# Patient Record
Sex: MALE | Race: WHITE | Employment: FULL TIME | URBAN - METROPOLITAN AREA
[De-identification: names, ages, dates, MRNs, and addresses within clinical notes are randomized per-mention and may not be internally consistent; named-entity substitution may affect disease eponyms.]

---

## 2021-06-09 ENCOUNTER — APPOINTMENT (OUTPATIENT)
Dept: ULTRASOUND IMAGING | Facility: HOSPITAL | Age: 51
DRG: 853 | End: 2021-06-09
Attending: NURSE PRACTITIONER
Payer: MEDICAID

## 2021-06-09 ENCOUNTER — APPOINTMENT (OUTPATIENT)
Dept: CT IMAGING | Facility: HOSPITAL | Age: 51
DRG: 853 | End: 2021-06-09
Attending: EMERGENCY MEDICINE
Payer: MEDICAID

## 2021-06-09 ENCOUNTER — HOSPITAL ENCOUNTER (INPATIENT)
Facility: HOSPITAL | Age: 51
LOS: 1 days | Discharge: ANOTHER HOSPITAL | DRG: 853 | End: 2021-06-10
Attending: EMERGENCY MEDICINE | Admitting: HOSPITALIST
Payer: MEDICAID

## 2021-06-09 DIAGNOSIS — A41.9 SEPSIS WITH ACUTE RENAL FAILURE, DUE TO UNSPECIFIED ORGANISM, UNSPECIFIED ACUTE RENAL FAILURE TYPE, UNSPECIFIED WHETHER SEPTIC SHOCK PRESENT (HCC): ICD-10-CM

## 2021-06-09 DIAGNOSIS — N17.9 SEPSIS WITH ACUTE RENAL FAILURE, DUE TO UNSPECIFIED ORGANISM, UNSPECIFIED ACUTE RENAL FAILURE TYPE, UNSPECIFIED WHETHER SEPTIC SHOCK PRESENT (HCC): ICD-10-CM

## 2021-06-09 DIAGNOSIS — R65.20 SEPSIS WITH ACUTE RENAL FAILURE, DUE TO UNSPECIFIED ORGANISM, UNSPECIFIED ACUTE RENAL FAILURE TYPE, UNSPECIFIED WHETHER SEPTIC SHOCK PRESENT (HCC): ICD-10-CM

## 2021-06-09 DIAGNOSIS — N49.3 FOURNIER'S GANGRENE IN MALE: Primary | ICD-10-CM

## 2021-06-09 DIAGNOSIS — N49.3 FOURNIER'S GANGRENE: ICD-10-CM

## 2021-06-09 DIAGNOSIS — R73.9 HYPERGLYCEMIA: ICD-10-CM

## 2021-06-09 DIAGNOSIS — N17.9 AKI (ACUTE KIDNEY INJURY) (HCC): ICD-10-CM

## 2021-06-09 PROCEDURE — 80048 BASIC METABOLIC PNL TOTAL CA: CPT | Performed by: EMERGENCY MEDICINE

## 2021-06-09 PROCEDURE — 85027 COMPLETE CBC AUTOMATED: CPT | Performed by: EMERGENCY MEDICINE

## 2021-06-09 PROCEDURE — 83605 ASSAY OF LACTIC ACID: CPT | Performed by: EMERGENCY MEDICINE

## 2021-06-09 PROCEDURE — 99285 EMERGENCY DEPT VISIT HI MDM: CPT

## 2021-06-09 PROCEDURE — 85007 BL SMEAR W/DIFF WBC COUNT: CPT | Performed by: EMERGENCY MEDICINE

## 2021-06-09 PROCEDURE — 85060 BLOOD SMEAR INTERPRETATION: CPT | Performed by: EMERGENCY MEDICINE

## 2021-06-09 PROCEDURE — 85730 THROMBOPLASTIN TIME PARTIAL: CPT | Performed by: EMERGENCY MEDICINE

## 2021-06-09 PROCEDURE — 76870 US EXAM SCROTUM: CPT | Performed by: NURSE PRACTITIONER

## 2021-06-09 PROCEDURE — 74177 CT ABD & PELVIS W/CONTRAST: CPT | Performed by: EMERGENCY MEDICINE

## 2021-06-09 PROCEDURE — 96365 THER/PROPH/DIAG IV INF INIT: CPT

## 2021-06-09 PROCEDURE — 36415 COLL VENOUS BLD VENIPUNCTURE: CPT

## 2021-06-09 PROCEDURE — 87040 BLOOD CULTURE FOR BACTERIA: CPT | Performed by: EMERGENCY MEDICINE

## 2021-06-09 PROCEDURE — 96375 TX/PRO/DX INJ NEW DRUG ADDON: CPT

## 2021-06-09 PROCEDURE — 96361 HYDRATE IV INFUSION ADD-ON: CPT

## 2021-06-09 PROCEDURE — 93975 VASCULAR STUDY: CPT | Performed by: NURSE PRACTITIONER

## 2021-06-09 PROCEDURE — 85025 COMPLETE CBC W/AUTO DIFF WBC: CPT | Performed by: EMERGENCY MEDICINE

## 2021-06-09 PROCEDURE — 85610 PROTHROMBIN TIME: CPT | Performed by: EMERGENCY MEDICINE

## 2021-06-09 RX ORDER — VANCOMYCIN 2 GRAM/500 ML IN 0.9 % SODIUM CHLORIDE INTRAVENOUS
25 ONCE
Status: COMPLETED | OUTPATIENT
Start: 2021-06-09 | End: 2021-06-10

## 2021-06-09 RX ORDER — MORPHINE SULFATE 4 MG/ML
6 INJECTION, SOLUTION INTRAMUSCULAR; INTRAVENOUS ONCE
Status: COMPLETED | OUTPATIENT
Start: 2021-06-09 | End: 2021-06-09

## 2021-06-09 RX ORDER — CLINDAMYCIN PHOSPHATE 600 MG/50ML
600 INJECTION INTRAVENOUS ONCE
Status: COMPLETED | OUTPATIENT
Start: 2021-06-09 | End: 2021-06-10

## 2021-06-10 ENCOUNTER — ANESTHESIA (OUTPATIENT)
Dept: SURGERY | Facility: HOSPITAL | Age: 51
DRG: 853 | End: 2021-06-10
Payer: MEDICAID

## 2021-06-10 ENCOUNTER — APPOINTMENT (OUTPATIENT)
Dept: GENERAL RADIOLOGY | Facility: HOSPITAL | Age: 51
DRG: 853 | End: 2021-06-10
Attending: EMERGENCY MEDICINE
Payer: MEDICAID

## 2021-06-10 ENCOUNTER — ANESTHESIA EVENT (OUTPATIENT)
Dept: SURGERY | Facility: HOSPITAL | Age: 51
DRG: 853 | End: 2021-06-10
Payer: MEDICAID

## 2021-06-10 VITALS
DIASTOLIC BLOOD PRESSURE: 42 MMHG | HEIGHT: 66 IN | OXYGEN SATURATION: 98 % | SYSTOLIC BLOOD PRESSURE: 107 MMHG | WEIGHT: 220 LBS | TEMPERATURE: 100 F | RESPIRATION RATE: 33 BRPM | HEART RATE: 53 BPM | BODY MASS INDEX: 35.36 KG/M2

## 2021-06-10 PROBLEM — N17.9 AKI (ACUTE KIDNEY INJURY) (HCC): Status: ACTIVE | Noted: 2021-06-10

## 2021-06-10 PROBLEM — R65.20 SEPSIS WITH ACUTE RENAL FAILURE, DUE TO UNSPECIFIED ORGANISM, UNSPECIFIED ACUTE RENAL FAILURE TYPE, UNSPECIFIED WHETHER SEPTIC SHOCK PRESENT (HCC): Status: ACTIVE | Noted: 2021-06-10

## 2021-06-10 PROBLEM — N17.9 SEPSIS WITH ACUTE RENAL FAILURE, DUE TO UNSPECIFIED ORGANISM, UNSPECIFIED ACUTE RENAL FAILURE TYPE, UNSPECIFIED WHETHER SEPTIC SHOCK PRESENT (HCC): Status: ACTIVE | Noted: 2021-06-10

## 2021-06-10 PROBLEM — T88.4XXA DIFFICULT INTUBATION: Status: ACTIVE | Noted: 2021-06-10

## 2021-06-10 PROBLEM — A41.9 SEPSIS WITH ACUTE RENAL FAILURE, DUE TO UNSPECIFIED ORGANISM, UNSPECIFIED ACUTE RENAL FAILURE TYPE, UNSPECIFIED WHETHER SEPTIC SHOCK PRESENT (HCC): Status: ACTIVE | Noted: 2021-06-10

## 2021-06-10 PROBLEM — R73.9 HYPERGLYCEMIA: Status: ACTIVE | Noted: 2021-06-10

## 2021-06-10 PROCEDURE — 83605 ASSAY OF LACTIC ACID: CPT | Performed by: EMERGENCY MEDICINE

## 2021-06-10 PROCEDURE — 87070 CULTURE OTHR SPECIMN AEROBIC: CPT | Performed by: UROLOGY

## 2021-06-10 PROCEDURE — 36600 WITHDRAWAL OF ARTERIAL BLOOD: CPT | Performed by: EMERGENCY MEDICINE

## 2021-06-10 PROCEDURE — 0WBM0ZZ EXCISION OF MALE PERINEUM, OPEN APPROACH: ICD-10-PCS | Performed by: UROLOGY

## 2021-06-10 PROCEDURE — 87206 SMEAR FLUORESCENT/ACID STAI: CPT | Performed by: UROLOGY

## 2021-06-10 PROCEDURE — 82962 GLUCOSE BLOOD TEST: CPT

## 2021-06-10 PROCEDURE — 87076 CULTURE ANAEROBE IDENT EACH: CPT | Performed by: UROLOGY

## 2021-06-10 PROCEDURE — S0077 INJECTION, CLINDAMYCIN PHOSP: HCPCS | Performed by: EMERGENCY MEDICINE

## 2021-06-10 PROCEDURE — 83605 ASSAY OF LACTIC ACID: CPT | Performed by: UROLOGY

## 2021-06-10 PROCEDURE — P9047 ALBUMIN (HUMAN), 25%, 50ML: HCPCS | Performed by: HOSPITALIST

## 2021-06-10 PROCEDURE — 87186 SC STD MICRODIL/AGAR DIL: CPT | Performed by: UROLOGY

## 2021-06-10 PROCEDURE — 87102 FUNGUS ISOLATION CULTURE: CPT | Performed by: UROLOGY

## 2021-06-10 PROCEDURE — 87205 SMEAR GRAM STAIN: CPT | Performed by: UROLOGY

## 2021-06-10 PROCEDURE — 87075 CULTR BACTERIA EXCEPT BLOOD: CPT | Performed by: UROLOGY

## 2021-06-10 PROCEDURE — 87176 TISSUE HOMOGENIZATION CULTR: CPT | Performed by: UROLOGY

## 2021-06-10 PROCEDURE — 80048 BASIC METABOLIC PNL TOTAL CA: CPT | Performed by: EMERGENCY MEDICINE

## 2021-06-10 PROCEDURE — 84478 ASSAY OF TRIGLYCERIDES: CPT | Performed by: UROLOGY

## 2021-06-10 PROCEDURE — 0VB50ZZ EXCISION OF SCROTUM, OPEN APPROACH: ICD-10-PCS | Performed by: UROLOGY

## 2021-06-10 PROCEDURE — 93010 ELECTROCARDIOGRAM REPORT: CPT | Performed by: EMERGENCY MEDICINE

## 2021-06-10 PROCEDURE — 87077 CULTURE AEROBIC IDENTIFY: CPT | Performed by: UROLOGY

## 2021-06-10 PROCEDURE — 94002 VENT MGMT INPAT INIT DAY: CPT

## 2021-06-10 PROCEDURE — 87116 MYCOBACTERIA CULTURE: CPT | Performed by: UROLOGY

## 2021-06-10 PROCEDURE — 93005 ELECTROCARDIOGRAM TRACING: CPT

## 2021-06-10 PROCEDURE — 82805 BLOOD GASES W/O2 SATURATION: CPT | Performed by: EMERGENCY MEDICINE

## 2021-06-10 PROCEDURE — 71045 X-RAY EXAM CHEST 1 VIEW: CPT | Performed by: EMERGENCY MEDICINE

## 2021-06-10 RX ORDER — HYDROCODONE BITARTRATE AND ACETAMINOPHEN 5; 325 MG/1; MG/1
2 TABLET ORAL AS NEEDED
Status: DISCONTINUED | OUTPATIENT
Start: 2021-06-10 | End: 2021-06-10

## 2021-06-10 RX ORDER — ONDANSETRON 2 MG/ML
INJECTION INTRAMUSCULAR; INTRAVENOUS AS NEEDED
Status: DISCONTINUED | OUTPATIENT
Start: 2021-06-10 | End: 2021-06-10 | Stop reason: SURG

## 2021-06-10 RX ORDER — LIDOCAINE HYDROCHLORIDE 10 MG/ML
INJECTION, SOLUTION EPIDURAL; INFILTRATION; INTRACAUDAL; PERINEURAL AS NEEDED
Status: DISCONTINUED | OUTPATIENT
Start: 2021-06-10 | End: 2021-06-10 | Stop reason: SURG

## 2021-06-10 RX ORDER — MORPHINE SULFATE 4 MG/ML
4 INJECTION, SOLUTION INTRAMUSCULAR; INTRAVENOUS EVERY 10 MIN PRN
Status: DISCONTINUED | OUTPATIENT
Start: 2021-06-10 | End: 2021-06-10

## 2021-06-10 RX ORDER — DEXTROSE MONOHYDRATE 25 G/50ML
50 INJECTION, SOLUTION INTRAVENOUS
Status: DISCONTINUED | OUTPATIENT
Start: 2021-06-10 | End: 2021-06-10

## 2021-06-10 RX ORDER — ACETAMINOPHEN 500 MG
1000 TABLET ORAL ONCE
Status: COMPLETED | OUTPATIENT
Start: 2021-06-10 | End: 2021-06-10

## 2021-06-10 RX ORDER — SODIUM CHLORIDE, SODIUM LACTATE, POTASSIUM CHLORIDE, CALCIUM CHLORIDE 600; 310; 30; 20 MG/100ML; MG/100ML; MG/100ML; MG/100ML
INJECTION, SOLUTION INTRAVENOUS CONTINUOUS
Status: DISCONTINUED | OUTPATIENT
Start: 2021-06-10 | End: 2021-06-10

## 2021-06-10 RX ORDER — MIDAZOLAM HYDROCHLORIDE 1 MG/ML
2 INJECTION INTRAMUSCULAR; INTRAVENOUS EVERY 5 MIN PRN
Status: DISCONTINUED | OUTPATIENT
Start: 2021-06-10 | End: 2021-06-10

## 2021-06-10 RX ORDER — NEOSTIGMINE METHYLSULFATE 1 MG/ML
INJECTION INTRAVENOUS AS NEEDED
Status: DISCONTINUED | OUTPATIENT
Start: 2021-06-10 | End: 2021-06-10 | Stop reason: SURG

## 2021-06-10 RX ORDER — ADENOSINE 3 MG/ML
INJECTION, SOLUTION INTRAVENOUS
Status: COMPLETED
Start: 2021-06-10 | End: 2021-06-10

## 2021-06-10 RX ORDER — LIDOCAINE HYDROCHLORIDE 40 MG/ML
SOLUTION TOPICAL AS NEEDED
Status: DISCONTINUED | OUTPATIENT
Start: 2021-06-10 | End: 2021-06-10 | Stop reason: SURG

## 2021-06-10 RX ORDER — HYDROCODONE BITARTRATE AND ACETAMINOPHEN 5; 325 MG/1; MG/1
1 TABLET ORAL AS NEEDED
Status: DISCONTINUED | OUTPATIENT
Start: 2021-06-10 | End: 2021-06-10

## 2021-06-10 RX ORDER — PROCHLORPERAZINE EDISYLATE 5 MG/ML
5 INJECTION INTRAMUSCULAR; INTRAVENOUS ONCE AS NEEDED
Status: DISCONTINUED | OUTPATIENT
Start: 2021-06-10 | End: 2021-06-10

## 2021-06-10 RX ORDER — VANCOMYCIN HYDROCHLORIDE
1250 EVERY 12 HOURS
Status: DISCONTINUED | OUTPATIENT
Start: 2021-06-11 | End: 2021-06-10

## 2021-06-10 RX ORDER — CHLORHEXIDINE GLUCONATE 0.12 MG/ML
15 RINSE ORAL
Status: DISCONTINUED | OUTPATIENT
Start: 2021-06-10 | End: 2021-06-10

## 2021-06-10 RX ORDER — HYDROMORPHONE HYDROCHLORIDE 1 MG/ML
0.2 INJECTION, SOLUTION INTRAMUSCULAR; INTRAVENOUS; SUBCUTANEOUS EVERY 5 MIN PRN
Status: DISCONTINUED | OUTPATIENT
Start: 2021-06-10 | End: 2021-06-10

## 2021-06-10 RX ORDER — HYDROMORPHONE HYDROCHLORIDE 1 MG/ML
0.6 INJECTION, SOLUTION INTRAMUSCULAR; INTRAVENOUS; SUBCUTANEOUS EVERY 5 MIN PRN
Status: DISCONTINUED | OUTPATIENT
Start: 2021-06-10 | End: 2021-06-10

## 2021-06-10 RX ORDER — MORPHINE SULFATE 4 MG/ML
2 INJECTION, SOLUTION INTRAMUSCULAR; INTRAVENOUS EVERY 10 MIN PRN
Status: DISCONTINUED | OUTPATIENT
Start: 2021-06-10 | End: 2021-06-10

## 2021-06-10 RX ORDER — ROCURONIUM BROMIDE 10 MG/ML
INJECTION, SOLUTION INTRAVENOUS AS NEEDED
Status: DISCONTINUED | OUTPATIENT
Start: 2021-06-10 | End: 2021-06-10 | Stop reason: SURG

## 2021-06-10 RX ORDER — ADENOSINE 3 MG/ML
6 INJECTION, SOLUTION INTRAVENOUS ONCE
Status: DISCONTINUED | OUTPATIENT
Start: 2021-06-10 | End: 2021-06-10

## 2021-06-10 RX ORDER — ALBUMIN (HUMAN) 12.5 G/50ML
25 SOLUTION INTRAVENOUS ONCE
Status: COMPLETED | OUTPATIENT
Start: 2021-06-10 | End: 2021-06-10

## 2021-06-10 RX ORDER — DILTIAZEM HYDROCHLORIDE 5 MG/ML
INJECTION INTRAVENOUS
Status: COMPLETED
Start: 2021-06-10 | End: 2021-06-10

## 2021-06-10 RX ORDER — HYDROMORPHONE HYDROCHLORIDE 1 MG/ML
0.4 INJECTION, SOLUTION INTRAMUSCULAR; INTRAVENOUS; SUBCUTANEOUS EVERY 5 MIN PRN
Status: DISCONTINUED | OUTPATIENT
Start: 2021-06-10 | End: 2021-06-10

## 2021-06-10 RX ORDER — PHENYLEPHRINE HCL 10 MG/ML
VIAL (ML) INJECTION AS NEEDED
Status: DISCONTINUED | OUTPATIENT
Start: 2021-06-10 | End: 2021-06-10 | Stop reason: SURG

## 2021-06-10 RX ORDER — GLYCOPYRROLATE 0.2 MG/ML
INJECTION, SOLUTION INTRAMUSCULAR; INTRAVENOUS AS NEEDED
Status: DISCONTINUED | OUTPATIENT
Start: 2021-06-10 | End: 2021-06-10 | Stop reason: SURG

## 2021-06-10 RX ORDER — CLINDAMYCIN PHOSPHATE 600 MG/50ML
600 INJECTION INTRAVENOUS EVERY 8 HOURS
Status: DISCONTINUED | OUTPATIENT
Start: 2021-06-10 | End: 2021-06-10

## 2021-06-10 RX ORDER — SODIUM CHLORIDE 9 MG/ML
INJECTION, SOLUTION INTRAVENOUS CONTINUOUS
Status: DISCONTINUED | OUTPATIENT
Start: 2021-06-10 | End: 2021-06-10

## 2021-06-10 RX ORDER — SODIUM CHLORIDE, SODIUM LACTATE, POTASSIUM CHLORIDE, CALCIUM CHLORIDE 600; 310; 30; 20 MG/100ML; MG/100ML; MG/100ML; MG/100ML
INJECTION, SOLUTION INTRAVENOUS CONTINUOUS PRN
Status: DISCONTINUED | OUTPATIENT
Start: 2021-06-10 | End: 2021-06-10 | Stop reason: SURG

## 2021-06-10 RX ORDER — ADENOSINE 3 MG/ML
6 INJECTION, SOLUTION INTRAVENOUS ONCE
Status: COMPLETED | OUTPATIENT
Start: 2021-06-10 | End: 2021-06-10

## 2021-06-10 RX ORDER — NALOXONE HYDROCHLORIDE 0.4 MG/ML
80 INJECTION, SOLUTION INTRAMUSCULAR; INTRAVENOUS; SUBCUTANEOUS AS NEEDED
Status: DISCONTINUED | OUTPATIENT
Start: 2021-06-10 | End: 2021-06-10

## 2021-06-10 RX ORDER — HALOPERIDOL 5 MG/ML
0.25 INJECTION INTRAMUSCULAR ONCE AS NEEDED
Status: DISCONTINUED | OUTPATIENT
Start: 2021-06-10 | End: 2021-06-10

## 2021-06-10 RX ORDER — DILTIAZEM HYDROCHLORIDE 5 MG/ML
20 INJECTION INTRAVENOUS ONCE
Status: COMPLETED | OUTPATIENT
Start: 2021-06-10 | End: 2021-06-10

## 2021-06-10 RX ORDER — DEXAMETHASONE SODIUM PHOSPHATE 4 MG/ML
VIAL (ML) INJECTION AS NEEDED
Status: DISCONTINUED | OUTPATIENT
Start: 2021-06-10 | End: 2021-06-10 | Stop reason: SURG

## 2021-06-10 RX ORDER — MORPHINE SULFATE 10 MG/ML
6 INJECTION, SOLUTION INTRAMUSCULAR; INTRAVENOUS EVERY 10 MIN PRN
Status: DISCONTINUED | OUTPATIENT
Start: 2021-06-10 | End: 2021-06-10

## 2021-06-10 RX ORDER — ONDANSETRON 2 MG/ML
4 INJECTION INTRAMUSCULAR; INTRAVENOUS ONCE AS NEEDED
Status: DISCONTINUED | OUTPATIENT
Start: 2021-06-10 | End: 2021-06-10

## 2021-06-10 RX ADMIN — LIDOCAINE HYDROCHLORIDE 4 ML: 40 SOLUTION TOPICAL at 03:08:00

## 2021-06-10 RX ADMIN — NEOSTIGMINE METHYLSULFATE 5 MG: 1 INJECTION INTRAVENOUS at 04:21:00

## 2021-06-10 RX ADMIN — LIDOCAINE HYDROCHLORIDE 50 MG: 10 INJECTION, SOLUTION EPIDURAL; INFILTRATION; INTRACAUDAL; PERINEURAL at 03:08:00

## 2021-06-10 RX ADMIN — ONDANSETRON 4 MG: 2 INJECTION INTRAMUSCULAR; INTRAVENOUS at 03:08:00

## 2021-06-10 RX ADMIN — SODIUM CHLORIDE, SODIUM LACTATE, POTASSIUM CHLORIDE, CALCIUM CHLORIDE: 600; 310; 30; 20 INJECTION, SOLUTION INTRAVENOUS at 03:08:00

## 2021-06-10 RX ADMIN — GLYCOPYRROLATE 1 MG: 0.2 INJECTION, SOLUTION INTRAMUSCULAR; INTRAVENOUS at 04:21:00

## 2021-06-10 RX ADMIN — MIDAZOLAM HYDROCHLORIDE 2 MG: 1 INJECTION INTRAMUSCULAR; INTRAVENOUS at 04:32:00

## 2021-06-10 RX ADMIN — PHENYLEPHRINE HCL 100 MCG: 10 MG/ML VIAL (ML) INJECTION at 03:56:00

## 2021-06-10 RX ADMIN — SODIUM CHLORIDE, SODIUM LACTATE, POTASSIUM CHLORIDE, CALCIUM CHLORIDE: 600; 310; 30; 20 INJECTION, SOLUTION INTRAVENOUS at 04:54:00

## 2021-06-10 RX ADMIN — ROCURONIUM BROMIDE 50 MG: 10 INJECTION, SOLUTION INTRAVENOUS at 03:26:00

## 2021-06-10 RX ADMIN — DEXAMETHASONE SODIUM PHOSPHATE 4 MG: 4 MG/ML VIAL (ML) INJECTION at 03:08:00

## 2021-06-10 RX ADMIN — SODIUM CHLORIDE, SODIUM LACTATE, POTASSIUM CHLORIDE, CALCIUM CHLORIDE: 600; 310; 30; 20 INJECTION, SOLUTION INTRAVENOUS at 04:04:00

## 2021-06-10 NOTE — PROGRESS NOTES
[facility] Pharmacy Note: Antimicrobial Weight Based Dose Adjustment for: piperacillin/tazobactam (Alexis Guevara)    Reina Will is a 53year old patient who has been prescribed piperacillin/tazobactam (ZOSYN) 3.375 gm x1 in ER.     CrCl cannot be calculated (No success

## 2021-06-10 NOTE — PROGRESS NOTES
[facility] Dosing Service    Initial Pharmacokinetic Consult for Vancomycin AUC Dosing    Rhona Dawn is a 53year old patient who is being treated for sepsis and gangrene . Pharmacy has been asked to dose vancomycin by Lisa Monday.     Weights:  Ideal miller

## 2021-06-10 NOTE — PROGRESS NOTES
[facility] transport is hear to take pt. Patient awake ,explaned the situation and plan of care. Vitals in limit.

## 2021-06-10 NOTE — ANESTHESIA PREPROCEDURE EVALUATION
Anesthesia PreOp Note    HPI:     Rhianna Colon is a 53year old male who presents for preoperative consultation requested by: Bethanie Osman MD    Date of Surgery: 6/9/2021 - 6/10/2021    Procedure(s):  Debridement of scrotum, perineum, and neptali rectal Fo     Lack of Transportation (Non-Medical):   Physical Activity:       Days of Exercise per Week:       Minutes of Exercise per Session:   Stress:       Feeling of Stress :   Social Connections:       Frequency of Communication with Friends and Family:     Cardiovascular - normal exam  (+) dysrhythmias,     ROS comment: PSVT in ER.   Received adnosine    Neuro/Psych      GI/Hepatic/Renal    (+) chronic renal disease ARF,     Comments: Lactic acid 3.3  Cr 1.9  Septic    Endo/Other    (+) diabetes mellitus poor

## 2021-06-10 NOTE — CONSULTS
[facility] AND [facility]  Report of Urology Consultation    [facility] Patient Status:  Emergency    1970 MRN [id number]   Location [address] Attending No att. providers found   Hosp Day # 0 PCP No primary care provider on 06/09/2021    CL 97 06/09/2021    CO2 18.0 06/09/2021     06/09/2021    CA 8.1 06/09/2021    PTT 25.6 06/09/2021    INR 1.39 06/09/2021    PTP 16.8 06/09/2021       Imaging:  CT Scan as noted above. Scrotal US CONCLUSION:   1.  Findings highly jenniffer

## 2021-06-10 NOTE — ANESTHESIA POSTPROCEDURE EVALUATION
Patient: Francis Guillermo    Procedure Summary     Date: 06/10/21 Room / Location: [address] [place] [address] MAIN OR 01 / 300 [facility] MAIN OR    Anesthesia Start: 2920 Anesthesia Stop: 8109    Procedure: Debridement of scrotum, perineum, and neptali rectal Anthony's Gangrene (N/A ) Diagnosi

## 2021-06-10 NOTE — CM/SW NOTE
EDCM talked with Shawn again, they are waiting for attending to call back. [facility] states they are not taking any transfers.  I was able to get a hold of ER charge RN adelso Welsh and she provided me with ER attending number for doctor to doctor discussion [phone]

## 2021-06-10 NOTE — CM/SW NOTE
[facility] called [facility] center [phone] regarding transfer request by [facility] ER physician Cherylene League. Face sheet faxed to [facility] and call back number for ER physician provided. Will continue to follow.

## 2021-06-10 NOTE — RESPIRATORY THERAPY NOTE
Received patient intubated with 8.0 secured 23 cms at the lip. from OR, placed patient on vent with the following settings: AC/12/500/+5/50%/ps 10 maintaining appropriate SpO2. . Auscultated ANGEL BS and suction provided as needed.  Patient tolerating vent wel

## 2021-06-10 NOTE — ED PROVIDER NOTES
Patient Seen in: [facility] AND [facility] Emergency Department      History   Patient presents with:  Testicular Swelling    Stated Complaint: testicular swelling     HPI/Subjective:   HPI    80-year-old male with no significant past medical history presents to Ears: TM's clear b/l  Nose: Nose normal.   Mouth/Throat: MMM, post OP clear with no exudates  Eyes: Conjunctivae and EOM are normal. PERRLA  Neck: Normal range of motion. Neck supple. No tracheal deviation present.    CV: s1s2+, RRR, no m/r/g, normal dist Abnormal; Notable for the following components:    PT 16.8 (*)     INR 1.39 (*)     All other components within normal limits   CBC W/ DIFFERENTIAL - Abnormal; Notable for the following components:    HGB 10.0 (*)     HCT 32.1 (*)     MCV 64.8 (*)     [facility] Dr. Artie Marsh at 12:06 AM LUC Brooks M.D. This report has been electronically signed and verified by the Radiologist whose name is printed above.     DD:  06/09/2021/DT:  06/10/2021    US SCROTUM W/ DOPPLER (VMQ=[id number])    Result Date: 6/9/2021 consultants/admitting physician, and documenting in patient's chart.   Admission disposition: 6/9/2021 10:27 PM            06/09/21 1946 06/09/21  2238 06/10/21  0133   BP: 151/74 136/56 109/78   Pulse: 74 77 (!) 157   Resp: 22 20 18   Temp: 98.2 °F (36.8 surgery to go over case.                    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

## 2021-06-10 NOTE — PROGRESS NOTES
Patient had extensive debridement for Anthony's gangrene. Came from ER tachycardic. Intermittently hypotensive in the OR. Difficult intubation--glidescope needed. Patient left intubated/sedated for sepsis/hemodynamic instability.     Report given

## 2021-06-10 NOTE — PROGRESS NOTES
Pt safety maintained. Pt arrived from OR at 0430. PACU nurses recovered patient in room 236. Pt is intubated. on ventilator. Tolerating suction. Restraints initiated. Matthew intact. Sedated with propofol. Fentanyl drip added for assumed pain.  Wound dressing

## 2021-06-10 NOTE — ED INITIAL ASSESSMENT (HPI)
The patient reports 1 week of hemorrhoid pain with puss and blood with testicular swelling that started yesterday. Malodorous scent detected in triage.

## 2021-06-10 NOTE — ANESTHESIA PROCEDURE NOTES
Airway  Date/Time: 6/10/2021 3:10 AM  Urgency: Elective      General Information and Staff    Patient location during procedure: OR  Anesthesiologist: Aaron Marley MD  Performed: anesthesiologist     Indications and Patient Condition  Indications for air

## 2021-06-10 NOTE — SEPSIS REASSESSMENT
[facility]      Sepsis Reassessment Note    /39 (BP Location: Right arm)   Pulse 55   Temp 99.4 °F (37.4 °C) (Temporal)   Resp (!) 31   Ht 5' 6\" (1.676 m)   Wt 220 lb (99.8 kg)   SpO2 100%   BMI 35.51 kg/m²      I completed the sep

## 2021-06-10 NOTE — OPERATIVE REPORT
[facility]  Urology Procedure Note  Orpah Lofty Patient Status:  Emergency    1970 MRN [id number]   Location [place] [address] Attending Kahlil Suazo MD   Hosp Day # 0 PCP No primary care provider on file.      Procedure: I developed scrotal swelling and pain starting two days ago. He also had drainage from the perineum and perirectal area.   A CT showed extensive Anthony's gangrene involving the scrotum, perineum, and rectal area with gas tracking up the left flank to the le 69

## 2021-06-10 NOTE — CM/SW NOTE
Gabbie Faulkner from [facility] called to request that [facility] surgeon talk to the Nicholas Cox after surgery is completed.

## 2021-06-10 NOTE — SIGNIFICANT EVENT
Brief Nocturnist Admit Note    54 y/o male w/ likely new DM w/o other known med problems admitted from the ED earlier w/ Anthony's gangrene. Pt given 3L of IVF in the ED with some renal insufficiency and presumed sepsis.   He was given IV clinda, vanco an

## 2021-06-11 NOTE — OPERATIVE REPORT
[facility]    PATIENT'S NAME: Lelo Freedman   ATTENDING PHYSICIAN: Mirta Mroeno.  Melissa Diaz MD   OPERATING PHYSICIAN: Krzysztof Miller MD   PATIENT ACCOUNT#:   [id number]    LOCATION:  [address] [place] [address] RECORD #:   [id number]       DATE OF BIRTH:  [birth year] little undermining of the tissue. We took great care avoiding any injury to the rectum itself. When this was said and done, we could see this to be fairly clean.   Using our finger through this opening, we could connect this with the scrotum, and then we 01:30:12  [facility] [id number]  [facility]/

## 2021-06-13 NOTE — H&P
[facility] Status:  [facility]    1970 MRN [id number]   Location [facility] 2W/SW Attending No att. providers found   [facility] Day # 1 PCP No primary care provider on file.      Date: reactive to light, extraocular movements are intact, Normal conjunctiva. HENT:  Normocephalic, oral mucosa is moist.  Head:  Normocephalic, atraumatic.   Neck:  Supple, non-tender, no carotid bruit, no jugular venous distention, no lymphadenopathy, no thyr

## 2021-06-18 NOTE — DISCHARGE SUMMARY
[facility] AND [facility]  Discharge Summary    Mariely Hernandez Patient Status:  Inpatient    1970 MRN [id number]   Location [facility] 2W/SW Attending No att. providers found   [address] Day # 1 PCP No primary care provider on file.      Date of Admission: Howie has Moderate Risk of readmission after discharge from the hospital.      Manuela Ramos  6/18/2021  8:27 AM    Timespent> 30 mins

## 2021-06-30 ENCOUNTER — LAB REQUISITION (OUTPATIENT)
Dept: LAB | Age: 51
End: 2021-06-30

## 2021-06-30 DIAGNOSIS — Z13.9 ENCOUNTER FOR SCREENING, UNSPECIFIED: ICD-10-CM

## 2021-06-30 PROCEDURE — U0003 INFECTIOUS AGENT DETECTION BY NUCLEIC ACID (DNA OR RNA); SEVERE ACUTE RESPIRATORY SYNDROME CORONAVIRUS 2 (SARS-COV-2) (CORONAVIRUS DISEASE [COVID-19]), AMPLIFIED PROBE TECHNIQUE, MAKING USE OF HIGH THROUGHPUT TECHNOLOGIES AS DESCRIBED BY CMS-2020-01-R: HCPCS | Performed by: CLINICAL MEDICAL LABORATORY

## 2021-06-30 PROCEDURE — U0005 INFEC AGEN DETEC AMPLI PROBE: HCPCS | Performed by: CLINICAL MEDICAL LABORATORY

## 2021-06-30 PROCEDURE — PSEU10635 SARS-COV-2 (2019-NCOV) BY PCR PB BKR PSEUDO CODE: Performed by: CLINICAL MEDICAL LABORATORY

## 2021-06-30 PROCEDURE — 85025 COMPLETE CBC W/AUTO DIFF WBC: CPT | Performed by: CLINICAL MEDICAL LABORATORY

## 2021-07-01 LAB
ALBUMIN SERPL-MCNC: 2.4 G/DL (ref 3.6–5.1)
ALBUMIN/GLOB SERPL: 0.6 {RATIO} (ref 1–2.4)
ALP SERPL-CCNC: 83 UNITS/L (ref 45–117)
ALT SERPL-CCNC: 64 UNITS/L
ANION GAP SERPL CALC-SCNC: 12 MMOL/L (ref 10–20)
APPEARANCE UR: CLEAR
AST SERPL-CCNC: 41 UNITS/L
BACTERIA #/AREA URNS HPF: NORMAL /HPF
BASOPHILS # BLD: 0.1 K/MCL (ref 0–0.3)
BASOPHILS NFR BLD: 1 %
BILIRUB SERPL-MCNC: 0.4 MG/DL (ref 0.2–1)
BILIRUB UR QL STRIP: NEGATIVE
BUN SERPL-MCNC: 14 MG/DL (ref 6–20)
BUN/CREAT SERPL: 23 (ref 7–25)
CALCIUM SERPL-MCNC: 8.7 MG/DL (ref 8.4–10.2)
CHLORIDE SERPL-SCNC: 103 MMOL/L (ref 98–107)
CO2 SERPL-SCNC: 27 MMOL/L (ref 21–32)
COLOR UR: YELLOW
CREAT SERPL-MCNC: 0.6 MG/DL (ref 0.67–1.17)
DEPRECATED RDW RBC: 62 FL (ref 39–50)
EOSINOPHIL # BLD: 0.7 K/MCL (ref 0–0.5)
EOSINOPHIL NFR BLD: 6 %
ERYTHROCYTE [DISTWIDTH] IN BLOOD: 21.7 % (ref 11–15)
FASTING DURATION TIME PATIENT: ABNORMAL H
GFR SERPLBLD BASED ON 1.73 SQ M-ARVRAT: >90 ML/MIN/1.73M2
GLOBULIN SER-MCNC: 4.3 G/DL (ref 2–4)
GLUCOSE SERPL-MCNC: 109 MG/DL (ref 65–99)
GLUCOSE UR STRIP-MCNC: NEGATIVE MG/DL
HCT VFR BLD CALC: 34.6 % (ref 39–51)
HGB BLD-MCNC: 10.7 G/DL (ref 13–17)
HGB UR QL STRIP: NEGATIVE
HYALINE CASTS #/AREA URNS LPF: NORMAL /LPF
IMM GRANULOCYTES # BLD AUTO: 0.1 K/MCL (ref 0–0.2)
IMM GRANULOCYTES # BLD: 1 %
KETONES UR STRIP-MCNC: NEGATIVE MG/DL
LEUKOCYTE ESTERASE UR QL STRIP: NEGATIVE
LYMPHOCYTES # BLD: 1.1 K/MCL (ref 1–4.8)
LYMPHOCYTES NFR BLD: 10 %
MCH RBC QN AUTO: 24.5 PG (ref 26–34)
MCHC RBC AUTO-ENTMCNC: 30.9 G/DL (ref 32–36.5)
MCV RBC AUTO: 79.4 FL (ref 78–100)
MONOCYTES # BLD: 0.7 K/MCL (ref 0.3–0.9)
MONOCYTES NFR BLD: 6 %
MUCOUS THREADS URNS QL MICRO: PRESENT
NEUTROPHILS # BLD: 8.3 K/MCL (ref 1.8–7.7)
NEUTROPHILS NFR BLD: 76 %
NITRITE UR QL STRIP: NEGATIVE
NRBC BLD MANUAL-RTO: 0 /100 WBC
PH UR STRIP: 6 [PH] (ref 5–7)
PHOSPHATE SERPL-MCNC: 4.6 MG/DL (ref 2.4–4.7)
PLATELET # BLD AUTO: 466 K/MCL (ref 140–450)
POTASSIUM SERPL-SCNC: 4.3 MMOL/L (ref 3.4–5.1)
PROT SERPL-MCNC: 6.7 G/DL (ref 6.4–8.2)
PROT UR STRIP-MCNC: NEGATIVE MG/DL
RBC # BLD: 4.36 MIL/MCL (ref 4.5–5.9)
RBC #/AREA URNS HPF: NORMAL /HPF
SARS-COV-2 RNA RESP QL NAA+PROBE: NOT DETECTED
SERVICE CMNT-IMP: NORMAL
SERVICE CMNT-IMP: NORMAL
SODIUM SERPL-SCNC: 138 MMOL/L (ref 135–145)
SP GR UR STRIP: 1.02 (ref 1–1.03)
SQUAMOUS #/AREA URNS HPF: NORMAL /HPF
TSH SERPL-ACNC: 0.87 MCUNITS/ML (ref 0.35–5)
UROBILINOGEN UR STRIP-MCNC: 0.2 MG/DL
WBC # BLD: 11 K/MCL (ref 4.2–11)
WBC #/AREA URNS HPF: NORMAL /HPF

## 2021-07-01 PROCEDURE — PSEU8250 COMPREHENSIVE METABOLIC PANEL: Performed by: CLINICAL MEDICAL LABORATORY

## 2021-07-01 PROCEDURE — 84100 ASSAY OF PHOSPHORUS: CPT | Performed by: CLINICAL MEDICAL LABORATORY

## 2021-07-01 PROCEDURE — 84443 ASSAY THYROID STIM HORMONE: CPT | Performed by: CLINICAL MEDICAL LABORATORY

## 2021-07-01 PROCEDURE — PSEU8458 URINALYSIS WITH MICROSCOPY & CULTURE IF INDICATED: Performed by: CLINICAL MEDICAL LABORATORY

## 2021-07-01 PROCEDURE — 80053 COMPREHEN METABOLIC PANEL: CPT | Performed by: CLINICAL MEDICAL LABORATORY

## 2021-07-01 PROCEDURE — PSEU8279 PHOSPHORUS: Performed by: CLINICAL MEDICAL LABORATORY

## 2021-07-01 PROCEDURE — PSEU8299 THYROID STIMULATING HORMONE: Performed by: CLINICAL MEDICAL LABORATORY

## 2021-07-01 PROCEDURE — 81001 URINALYSIS AUTO W/SCOPE: CPT | Performed by: CLINICAL MEDICAL LABORATORY

## 2021-07-02 ENCOUNTER — LAB REQUISITION (OUTPATIENT)
Dept: LAB | Age: 51
End: 2021-07-02

## 2021-07-02 DIAGNOSIS — Z13.9 ENCOUNTER FOR SCREENING, UNSPECIFIED: ICD-10-CM

## 2021-07-02 LAB
ANION GAP SERPL CALC-SCNC: 13 MMOL/L (ref 10–20)
BUN SERPL-MCNC: 15 MG/DL (ref 6–20)
BUN/CREAT SERPL: 25 (ref 7–25)
CALCIUM SERPL-MCNC: 8.9 MG/DL (ref 8.4–10.2)
CHLORIDE SERPL-SCNC: 102 MMOL/L (ref 98–107)
CO2 SERPL-SCNC: 27 MMOL/L (ref 21–32)
CREAT SERPL-MCNC: 0.6 MG/DL (ref 0.67–1.17)
DEPRECATED RDW RBC: 62.6 FL (ref 39–50)
ERYTHROCYTE [DISTWIDTH] IN BLOOD: 21.4 % (ref 11–15)
FASTING DURATION TIME PATIENT: ABNORMAL H
GFR SERPLBLD BASED ON 1.73 SQ M-ARVRAT: >90 ML/MIN/1.73M2
GLUCOSE SERPL-MCNC: 121 MG/DL (ref 65–99)
HCT VFR BLD CALC: 36.3 % (ref 39–51)
HGB BLD-MCNC: 11.1 G/DL (ref 13–17)
MAGNESIUM SERPL-MCNC: 2.1 MG/DL (ref 1.7–2.4)
MCH RBC QN AUTO: 24.4 PG (ref 26–34)
MCHC RBC AUTO-ENTMCNC: 30.6 G/DL (ref 32–36.5)
MCV RBC AUTO: 80 FL (ref 78–100)
NRBC BLD MANUAL-RTO: 0 /100 WBC
PHOSPHATE SERPL-MCNC: 4.3 MG/DL (ref 2.4–4.7)
PLATELET # BLD AUTO: 477 K/MCL (ref 140–450)
POTASSIUM SERPL-SCNC: 4.5 MMOL/L (ref 3.4–5.1)
RBC # BLD: 4.54 MIL/MCL (ref 4.5–5.9)
SODIUM SERPL-SCNC: 137 MMOL/L (ref 135–145)
WBC # BLD: 11.6 K/MCL (ref 4.2–11)

## 2021-07-02 PROCEDURE — PSEU8235 BASIC METABOLIC PANEL: Performed by: CLINICAL MEDICAL LABORATORY

## 2021-07-02 PROCEDURE — 85027 COMPLETE CBC AUTOMATED: CPT | Performed by: CLINICAL MEDICAL LABORATORY

## 2021-07-02 PROCEDURE — PSEU10425 CBC NO DIFFERENTIAL (PERFORMABLE ONLY): Performed by: CLINICAL MEDICAL LABORATORY

## 2021-07-02 PROCEDURE — 83735 ASSAY OF MAGNESIUM: CPT | Performed by: CLINICAL MEDICAL LABORATORY

## 2021-07-02 PROCEDURE — PSEU8279 PHOSPHORUS: Performed by: CLINICAL MEDICAL LABORATORY

## 2021-07-02 PROCEDURE — 84100 ASSAY OF PHOSPHORUS: CPT | Performed by: CLINICAL MEDICAL LABORATORY

## 2021-07-02 PROCEDURE — 80048 BASIC METABOLIC PNL TOTAL CA: CPT | Performed by: CLINICAL MEDICAL LABORATORY

## 2021-07-02 PROCEDURE — PSEU8274 MAGNESIUM: Performed by: CLINICAL MEDICAL LABORATORY

## 2021-07-03 ENCOUNTER — LAB REQUISITION (OUTPATIENT)
Dept: LAB | Age: 51
End: 2021-07-03

## 2021-07-03 DIAGNOSIS — Z13.9 ENCOUNTER FOR SCREENING, UNSPECIFIED: ICD-10-CM

## 2021-07-03 LAB
DEPRECATED RDW RBC: 61.2 FL (ref 39–50)
ERYTHROCYTE [DISTWIDTH] IN BLOOD: 21.2 % (ref 11–15)
HCT VFR BLD CALC: 33 % (ref 39–51)
HGB BLD-MCNC: 10.4 G/DL (ref 13–17)
MCH RBC QN AUTO: 25.1 PG (ref 26–34)
MCHC RBC AUTO-ENTMCNC: 31.5 G/DL (ref 32–36.5)
MCV RBC AUTO: 79.7 FL (ref 78–100)
NRBC BLD MANUAL-RTO: 0 /100 WBC
PLATELET # BLD AUTO: 399 K/MCL (ref 140–450)
RBC # BLD: 4.14 MIL/MCL (ref 4.5–5.9)
WBC # BLD: 10 K/MCL (ref 4.2–11)

## 2021-07-03 PROCEDURE — PSEU10425 CBC NO DIFFERENTIAL (PERFORMABLE ONLY): Performed by: CLINICAL MEDICAL LABORATORY

## 2021-07-03 PROCEDURE — 85027 COMPLETE CBC AUTOMATED: CPT | Performed by: CLINICAL MEDICAL LABORATORY

## 2021-07-06 ENCOUNTER — LAB REQUISITION (OUTPATIENT)
Dept: LAB | Age: 51
End: 2021-07-06

## 2021-07-06 DIAGNOSIS — Z13.9 ENCOUNTER FOR SCREENING, UNSPECIFIED: ICD-10-CM

## 2021-07-06 LAB
ALBUMIN SERPL-MCNC: 2.5 G/DL (ref 3.6–5.1)
ALBUMIN/GLOB SERPL: 0.6 {RATIO} (ref 1–2.4)
ALP SERPL-CCNC: 91 UNITS/L (ref 45–117)
ALT SERPL-CCNC: 94 UNITS/L
ANION GAP SERPL CALC-SCNC: 13 MMOL/L (ref 10–20)
AST SERPL-CCNC: 30 UNITS/L
BILIRUB SERPL-MCNC: 0.4 MG/DL (ref 0.2–1)
BUN SERPL-MCNC: 13 MG/DL (ref 6–20)
BUN/CREAT SERPL: 21 (ref 7–25)
CALCIUM SERPL-MCNC: 8.8 MG/DL (ref 8.4–10.2)
CHLORIDE SERPL-SCNC: 105 MMOL/L (ref 98–107)
CO2 SERPL-SCNC: 26 MMOL/L (ref 21–32)
CREAT SERPL-MCNC: 0.62 MG/DL (ref 0.67–1.17)
DEPRECATED RDW RBC: 57.2 FL (ref 39–50)
ERYTHROCYTE [DISTWIDTH] IN BLOOD: 19.8 % (ref 11–15)
FASTING DURATION TIME PATIENT: ABNORMAL H
GFR SERPLBLD BASED ON 1.73 SQ M-ARVRAT: >90 ML/MIN/1.73M2
GLOBULIN SER-MCNC: 4.1 G/DL (ref 2–4)
GLUCOSE SERPL-MCNC: 113 MG/DL (ref 65–99)
HBA1C MFR BLD: 5.5 % (ref 4.5–5.6)
HCT VFR BLD CALC: 34.1 % (ref 39–51)
HGB BLD-MCNC: 10.4 G/DL (ref 13–17)
MAGNESIUM SERPL-MCNC: 2.2 MG/DL (ref 1.7–2.4)
MCH RBC QN AUTO: 24.4 PG (ref 26–34)
MCHC RBC AUTO-ENTMCNC: 30.5 G/DL (ref 32–36.5)
MCV RBC AUTO: 80 FL (ref 78–100)
NRBC BLD MANUAL-RTO: 0 /100 WBC
PHOSPHATE SERPL-MCNC: 4.9 MG/DL (ref 2.4–4.7)
PLATELET # BLD AUTO: 406 K/MCL (ref 140–450)
POTASSIUM SERPL-SCNC: 4.4 MMOL/L (ref 3.4–5.1)
PROT SERPL-MCNC: 6.6 G/DL (ref 6.4–8.2)
RBC # BLD: 4.26 MIL/MCL (ref 4.5–5.9)
SODIUM SERPL-SCNC: 140 MMOL/L (ref 135–145)
WBC # BLD: 8 K/MCL (ref 4.2–11)

## 2021-07-06 PROCEDURE — 83735 ASSAY OF MAGNESIUM: CPT | Performed by: CLINICAL MEDICAL LABORATORY

## 2021-07-06 PROCEDURE — PSEU8266 GLYCOHEMOGLOBIN: Performed by: CLINICAL MEDICAL LABORATORY

## 2021-07-06 PROCEDURE — 84100 ASSAY OF PHOSPHORUS: CPT | Performed by: CLINICAL MEDICAL LABORATORY

## 2021-07-06 PROCEDURE — 85027 COMPLETE CBC AUTOMATED: CPT | Performed by: CLINICAL MEDICAL LABORATORY

## 2021-07-06 PROCEDURE — PSEU8279 PHOSPHORUS: Performed by: CLINICAL MEDICAL LABORATORY

## 2021-07-06 PROCEDURE — 83036 HEMOGLOBIN GLYCOSYLATED A1C: CPT | Performed by: CLINICAL MEDICAL LABORATORY

## 2021-07-06 PROCEDURE — 80053 COMPREHEN METABOLIC PANEL: CPT | Performed by: CLINICAL MEDICAL LABORATORY

## 2021-07-06 PROCEDURE — PSEU8274 MAGNESIUM: Performed by: CLINICAL MEDICAL LABORATORY

## 2021-07-06 PROCEDURE — PSEU10425 CBC NO DIFFERENTIAL (PERFORMABLE ONLY): Performed by: CLINICAL MEDICAL LABORATORY

## 2021-07-06 PROCEDURE — PSEU8250 COMPREHENSIVE METABOLIC PANEL: Performed by: CLINICAL MEDICAL LABORATORY

## 2021-07-09 ENCOUNTER — LAB REQUISITION (OUTPATIENT)
Dept: LAB | Age: 51
End: 2021-07-09

## 2021-07-09 DIAGNOSIS — Z13.9 ENCOUNTER FOR SCREENING, UNSPECIFIED: ICD-10-CM

## 2021-07-09 LAB
ANION GAP SERPL CALC-SCNC: 11 MMOL/L (ref 10–20)
BUN SERPL-MCNC: 12 MG/DL (ref 6–20)
BUN/CREAT SERPL: 18 (ref 7–25)
CALCIUM SERPL-MCNC: 8.8 MG/DL (ref 8.4–10.2)
CHLORIDE SERPL-SCNC: 104 MMOL/L (ref 98–107)
CO2 SERPL-SCNC: 29 MMOL/L (ref 21–32)
CREAT SERPL-MCNC: 0.67 MG/DL (ref 0.67–1.17)
DEPRECATED RDW RBC: 57.5 FL (ref 39–50)
ERYTHROCYTE [DISTWIDTH] IN BLOOD: 19.6 % (ref 11–15)
FASTING DURATION TIME PATIENT: ABNORMAL H
GFR SERPLBLD BASED ON 1.73 SQ M-ARVRAT: >90 ML/MIN/1.73M2
GLUCOSE SERPL-MCNC: 103 MG/DL (ref 65–99)
HCT VFR BLD CALC: 34.8 % (ref 39–51)
HGB BLD-MCNC: 10.5 G/DL (ref 13–17)
MAGNESIUM SERPL-MCNC: 2.3 MG/DL (ref 1.7–2.4)
MCH RBC QN AUTO: 24.3 PG (ref 26–34)
MCHC RBC AUTO-ENTMCNC: 30.2 G/DL (ref 32–36.5)
MCV RBC AUTO: 80.6 FL (ref 78–100)
NRBC BLD MANUAL-RTO: 0 /100 WBC
PHOSPHATE SERPL-MCNC: 4.8 MG/DL (ref 2.4–4.7)
PLATELET # BLD AUTO: 401 K/MCL (ref 140–450)
POTASSIUM SERPL-SCNC: 4.3 MMOL/L (ref 3.4–5.1)
RBC # BLD: 4.32 MIL/MCL (ref 4.5–5.9)
SODIUM SERPL-SCNC: 140 MMOL/L (ref 135–145)
WBC # BLD: 7.4 K/MCL (ref 4.2–11)

## 2021-07-09 PROCEDURE — PSEU8274 MAGNESIUM: Performed by: CLINICAL MEDICAL LABORATORY

## 2021-07-09 PROCEDURE — 84100 ASSAY OF PHOSPHORUS: CPT | Performed by: CLINICAL MEDICAL LABORATORY

## 2021-07-09 PROCEDURE — PSEU8235 BASIC METABOLIC PANEL: Performed by: CLINICAL MEDICAL LABORATORY

## 2021-07-09 PROCEDURE — 85027 COMPLETE CBC AUTOMATED: CPT | Performed by: CLINICAL MEDICAL LABORATORY

## 2021-07-09 PROCEDURE — PSEU8279 PHOSPHORUS: Performed by: CLINICAL MEDICAL LABORATORY

## 2021-07-09 PROCEDURE — PSEU10425 CBC NO DIFFERENTIAL (PERFORMABLE ONLY): Performed by: CLINICAL MEDICAL LABORATORY

## 2021-07-09 PROCEDURE — 80048 BASIC METABOLIC PNL TOTAL CA: CPT | Performed by: CLINICAL MEDICAL LABORATORY

## 2021-07-09 PROCEDURE — 83735 ASSAY OF MAGNESIUM: CPT | Performed by: CLINICAL MEDICAL LABORATORY

## 2021-07-13 ENCOUNTER — LAB REQUISITION (OUTPATIENT)
Dept: LAB | Age: 51
End: 2021-07-13

## 2021-07-13 DIAGNOSIS — Z13.9 ENCOUNTER FOR SCREENING, UNSPECIFIED: ICD-10-CM

## 2021-07-13 LAB
ALBUMIN SERPL-MCNC: 2.6 G/DL (ref 3.6–5.1)
ALBUMIN/GLOB SERPL: 0.7 {RATIO} (ref 1–2.4)
ALP SERPL-CCNC: 61 UNITS/L (ref 45–117)
ALT SERPL-CCNC: 45 UNITS/L
ANION GAP SERPL CALC-SCNC: 9 MMOL/L (ref 10–20)
AST SERPL-CCNC: 14 UNITS/L
BILIRUB SERPL-MCNC: 0.3 MG/DL (ref 0.2–1)
BUN SERPL-MCNC: 18 MG/DL (ref 6–20)
BUN/CREAT SERPL: 23 (ref 7–25)
CALCIUM SERPL-MCNC: 9 MG/DL (ref 8.4–10.2)
CHLORIDE SERPL-SCNC: 103 MMOL/L (ref 98–107)
CO2 SERPL-SCNC: 31 MMOL/L (ref 21–32)
CREAT SERPL-MCNC: 0.78 MG/DL (ref 0.67–1.17)
DEPRECATED RDW RBC: 54.6 FL (ref 39–50)
ERYTHROCYTE [DISTWIDTH] IN BLOOD: 19 % (ref 11–15)
FASTING DURATION TIME PATIENT: ABNORMAL H
GFR SERPLBLD BASED ON 1.73 SQ M-ARVRAT: >90 ML/MIN/1.73M2
GLOBULIN SER-MCNC: 3.9 G/DL (ref 2–4)
GLUCOSE SERPL-MCNC: 106 MG/DL (ref 65–99)
HCT VFR BLD CALC: 33.3 % (ref 39–51)
HGB BLD-MCNC: 10.1 G/DL (ref 13–17)
MAGNESIUM SERPL-MCNC: 2.2 MG/DL (ref 1.7–2.4)
MCH RBC QN AUTO: 23.8 PG (ref 26–34)
MCHC RBC AUTO-ENTMCNC: 30.3 G/DL (ref 32–36.5)
MCV RBC AUTO: 78.5 FL (ref 78–100)
NRBC BLD MANUAL-RTO: 0 /100 WBC
PHOSPHATE SERPL-MCNC: 5.6 MG/DL (ref 2.4–4.7)
PLATELET # BLD AUTO: 359 K/MCL (ref 140–450)
POTASSIUM SERPL-SCNC: 4.2 MMOL/L (ref 3.4–5.1)
PROT SERPL-MCNC: 6.5 G/DL (ref 6.4–8.2)
RBC # BLD: 4.24 MIL/MCL (ref 4.5–5.9)
SODIUM SERPL-SCNC: 139 MMOL/L (ref 135–145)
WBC # BLD: 8.7 K/MCL (ref 4.2–11)

## 2021-07-13 PROCEDURE — 85027 COMPLETE CBC AUTOMATED: CPT | Performed by: CLINICAL MEDICAL LABORATORY

## 2021-07-13 PROCEDURE — PSEU8250 COMPREHENSIVE METABOLIC PANEL: Performed by: CLINICAL MEDICAL LABORATORY

## 2021-07-13 PROCEDURE — 84100 ASSAY OF PHOSPHORUS: CPT | Performed by: CLINICAL MEDICAL LABORATORY

## 2021-07-13 PROCEDURE — PSEU10425 CBC NO DIFFERENTIAL (PERFORMABLE ONLY): Performed by: CLINICAL MEDICAL LABORATORY

## 2021-07-13 PROCEDURE — 80053 COMPREHEN METABOLIC PANEL: CPT | Performed by: CLINICAL MEDICAL LABORATORY

## 2021-07-13 PROCEDURE — PSEU8274 MAGNESIUM: Performed by: CLINICAL MEDICAL LABORATORY

## 2021-07-13 PROCEDURE — PSEU8279 PHOSPHORUS: Performed by: CLINICAL MEDICAL LABORATORY

## 2021-07-13 PROCEDURE — 83735 ASSAY OF MAGNESIUM: CPT | Performed by: CLINICAL MEDICAL LABORATORY

## 2021-07-15 ENCOUNTER — LAB REQUISITION (OUTPATIENT)
Dept: LAB | Age: 51
End: 2021-07-15

## 2021-07-15 DIAGNOSIS — Z13.9 ENCOUNTER FOR SCREENING, UNSPECIFIED: ICD-10-CM

## 2021-07-15 LAB
APPEARANCE UR: ABNORMAL
BACTERIA #/AREA URNS HPF: ABNORMAL /HPF
BILIRUB UR QL STRIP: NEGATIVE
CAOX CRY URNS QL MICRO: PRESENT
COLOR UR: YELLOW
GLUCOSE UR STRIP-MCNC: NEGATIVE MG/DL
HGB UR QL STRIP: ABNORMAL
HYALINE CASTS #/AREA URNS LPF: ABNORMAL /LPF
KETONES UR STRIP-MCNC: NEGATIVE MG/DL
LEUKOCYTE ESTERASE UR QL STRIP: ABNORMAL
MUCOUS THREADS URNS QL MICRO: PRESENT
NITRITE UR QL STRIP: NEGATIVE
PH UR STRIP: 8 [PH] (ref 5–7)
PROT UR STRIP-MCNC: 100 MG/DL
RBC #/AREA URNS HPF: >100 /HPF
SP GR UR STRIP: 1.02 (ref 1–1.03)
SQUAMOUS #/AREA URNS HPF: ABNORMAL /HPF
UROBILINOGEN UR STRIP-MCNC: 0.2 MG/DL
WBC #/AREA URNS HPF: >100 /HPF

## 2021-07-15 PROCEDURE — 87040 BLOOD CULTURE FOR BACTERIA: CPT | Performed by: CLINICAL MEDICAL LABORATORY

## 2021-07-15 PROCEDURE — 81001 URINALYSIS AUTO W/SCOPE: CPT | Performed by: CLINICAL MEDICAL LABORATORY

## 2021-07-15 PROCEDURE — 87077 CULTURE AEROBIC IDENTIFY: CPT | Performed by: CLINICAL MEDICAL LABORATORY

## 2021-07-15 PROCEDURE — PSEU8964 BLOOD CULTURE: Performed by: CLINICAL MEDICAL LABORATORY

## 2021-07-15 PROCEDURE — 87186 SC STD MICRODIL/AGAR DIL: CPT | Performed by: CLINICAL MEDICAL LABORATORY

## 2021-07-15 PROCEDURE — PSEU9005 URINE, BACTERIAL CULTURE: Performed by: CLINICAL MEDICAL LABORATORY

## 2021-07-15 PROCEDURE — 87086 URINE CULTURE/COLONY COUNT: CPT | Performed by: CLINICAL MEDICAL LABORATORY

## 2021-07-16 ENCOUNTER — LAB REQUISITION (OUTPATIENT)
Dept: LAB | Age: 51
End: 2021-07-16

## 2021-07-16 DIAGNOSIS — Z13.9 ENCOUNTER FOR SCREENING, UNSPECIFIED: ICD-10-CM

## 2021-07-16 LAB
ANION GAP SERPL CALC-SCNC: 9 MMOL/L (ref 10–20)
BUN SERPL-MCNC: 15 MG/DL (ref 6–20)
BUN/CREAT SERPL: 25 (ref 7–25)
CALCIUM SERPL-MCNC: 8.5 MG/DL (ref 8.4–10.2)
CHLORIDE SERPL-SCNC: 104 MMOL/L (ref 98–107)
CO2 SERPL-SCNC: 29 MMOL/L (ref 21–32)
CREAT SERPL-MCNC: 0.61 MG/DL (ref 0.67–1.17)
DEPRECATED RDW RBC: 54.5 FL (ref 39–50)
ERYTHROCYTE [DISTWIDTH] IN BLOOD: 18.7 % (ref 11–15)
FASTING DURATION TIME PATIENT: ABNORMAL H
GFR SERPLBLD BASED ON 1.73 SQ M-ARVRAT: >90 ML/MIN/1.73M2
GLUCOSE SERPL-MCNC: 110 MG/DL (ref 65–99)
HCT VFR BLD CALC: 30.7 % (ref 39–51)
HGB BLD-MCNC: 9.4 G/DL (ref 13–17)
MAGNESIUM SERPL-MCNC: 2.4 MG/DL (ref 1.7–2.4)
MCH RBC QN AUTO: 24.1 PG (ref 26–34)
MCHC RBC AUTO-ENTMCNC: 30.6 G/DL (ref 32–36.5)
MCV RBC AUTO: 78.7 FL (ref 78–100)
NRBC BLD MANUAL-RTO: 0 /100 WBC
PHOSPHATE SERPL-MCNC: 4.5 MG/DL (ref 2.4–4.7)
PLATELET # BLD AUTO: 275 K/MCL (ref 140–450)
POTASSIUM SERPL-SCNC: 4.2 MMOL/L (ref 3.4–5.1)
RBC # BLD: 3.9 MIL/MCL (ref 4.5–5.9)
SODIUM SERPL-SCNC: 138 MMOL/L (ref 135–145)
VANCOMYCIN TROUGH SERPL-MCNC: 20.9 MCG/ML (ref 10–20)
WBC # BLD: 9 K/MCL (ref 4.2–11)

## 2021-07-16 PROCEDURE — PSEU8235 BASIC METABOLIC PANEL: Performed by: CLINICAL MEDICAL LABORATORY

## 2021-07-16 PROCEDURE — PSEU8279 PHOSPHORUS: Performed by: CLINICAL MEDICAL LABORATORY

## 2021-07-16 PROCEDURE — 84100 ASSAY OF PHOSPHORUS: CPT | Performed by: CLINICAL MEDICAL LABORATORY

## 2021-07-16 PROCEDURE — 83735 ASSAY OF MAGNESIUM: CPT | Performed by: CLINICAL MEDICAL LABORATORY

## 2021-07-16 PROCEDURE — 80202 ASSAY OF VANCOMYCIN: CPT | Performed by: CLINICAL MEDICAL LABORATORY

## 2021-07-16 PROCEDURE — PSEU8274 MAGNESIUM: Performed by: CLINICAL MEDICAL LABORATORY

## 2021-07-16 PROCEDURE — 80048 BASIC METABOLIC PNL TOTAL CA: CPT | Performed by: CLINICAL MEDICAL LABORATORY

## 2021-07-16 PROCEDURE — 85027 COMPLETE CBC AUTOMATED: CPT | Performed by: CLINICAL MEDICAL LABORATORY

## 2021-07-16 PROCEDURE — PSEU10425 CBC NO DIFFERENTIAL (PERFORMABLE ONLY): Performed by: CLINICAL MEDICAL LABORATORY

## 2021-07-16 PROCEDURE — PSEU8228 VANCOMYCIN, TROUGH: Performed by: CLINICAL MEDICAL LABORATORY

## 2021-07-18 LAB — BACTERIA UR CULT: ABNORMAL

## 2021-07-20 ENCOUNTER — LAB REQUISITION (OUTPATIENT)
Dept: LAB | Age: 51
End: 2021-07-20

## 2021-07-20 DIAGNOSIS — Z13.9 ENCOUNTER FOR SCREENING, UNSPECIFIED: ICD-10-CM

## 2021-07-20 LAB
ALBUMIN SERPL-MCNC: 2.4 G/DL (ref 3.6–5.1)
ALBUMIN/GLOB SERPL: 0.6 {RATIO} (ref 1–2.4)
ALP SERPL-CCNC: 59 UNITS/L (ref 45–117)
ALT SERPL-CCNC: 58 UNITS/L
ANION GAP SERPL CALC-SCNC: 10 MMOL/L (ref 10–20)
AST SERPL-CCNC: 19 UNITS/L
BACTERIA BLD CULT: NORMAL
BACTERIA BLD CULT: NORMAL
BILIRUB SERPL-MCNC: 0.3 MG/DL (ref 0.2–1)
BUN SERPL-MCNC: 9 MG/DL (ref 6–20)
BUN/CREAT SERPL: 14 (ref 7–25)
CALCIUM SERPL-MCNC: 8.7 MG/DL (ref 8.4–10.2)
CHLORIDE SERPL-SCNC: 106 MMOL/L (ref 98–107)
CO2 SERPL-SCNC: 29 MMOL/L (ref 21–32)
CREAT SERPL-MCNC: 0.66 MG/DL (ref 0.67–1.17)
DEPRECATED RDW RBC: 52.9 FL (ref 39–50)
ERYTHROCYTE [DISTWIDTH] IN BLOOD: 18.7 % (ref 11–15)
FASTING DURATION TIME PATIENT: ABNORMAL H
GFR SERPLBLD BASED ON 1.73 SQ M-ARVRAT: >90 ML/MIN/1.73M2
GLOBULIN SER-MCNC: 4.1 G/DL (ref 2–4)
GLUCOSE SERPL-MCNC: 116 MG/DL (ref 65–99)
HCT VFR BLD CALC: 33.7 % (ref 39–51)
HGB BLD-MCNC: 10.2 G/DL (ref 13–17)
MAGNESIUM SERPL-MCNC: 2.4 MG/DL (ref 1.7–2.4)
MCH RBC QN AUTO: 23.6 PG (ref 26–34)
MCHC RBC AUTO-ENTMCNC: 30.3 G/DL (ref 32–36.5)
MCV RBC AUTO: 77.8 FL (ref 78–100)
NRBC BLD MANUAL-RTO: 0 /100 WBC
PHOSPHATE SERPL-MCNC: 5.1 MG/DL (ref 2.4–4.7)
PLATELET # BLD AUTO: 243 K/MCL (ref 140–450)
POTASSIUM SERPL-SCNC: 4.3 MMOL/L (ref 3.4–5.1)
PROT SERPL-MCNC: 6.5 G/DL (ref 6.4–8.2)
RBC # BLD: 4.33 MIL/MCL (ref 4.5–5.9)
SODIUM SERPL-SCNC: 141 MMOL/L (ref 135–145)
WBC # BLD: 7.9 K/MCL (ref 4.2–11)

## 2021-07-20 PROCEDURE — PSEU8279 PHOSPHORUS: Performed by: CLINICAL MEDICAL LABORATORY

## 2021-07-20 PROCEDURE — 85027 COMPLETE CBC AUTOMATED: CPT | Performed by: CLINICAL MEDICAL LABORATORY

## 2021-07-20 PROCEDURE — 83735 ASSAY OF MAGNESIUM: CPT | Performed by: CLINICAL MEDICAL LABORATORY

## 2021-07-20 PROCEDURE — PSEU8250 COMPREHENSIVE METABOLIC PANEL: Performed by: CLINICAL MEDICAL LABORATORY

## 2021-07-20 PROCEDURE — 80053 COMPREHEN METABOLIC PANEL: CPT | Performed by: CLINICAL MEDICAL LABORATORY

## 2021-07-20 PROCEDURE — 84100 ASSAY OF PHOSPHORUS: CPT | Performed by: CLINICAL MEDICAL LABORATORY

## 2021-07-20 PROCEDURE — PSEU8274 MAGNESIUM: Performed by: CLINICAL MEDICAL LABORATORY

## 2021-07-20 PROCEDURE — PSEU10425 CBC NO DIFFERENTIAL (PERFORMABLE ONLY): Performed by: CLINICAL MEDICAL LABORATORY

## 2021-07-23 ENCOUNTER — LAB REQUISITION (OUTPATIENT)
Dept: LAB | Age: 51
End: 2021-07-23

## 2021-07-23 DIAGNOSIS — Z13.9 ENCOUNTER FOR SCREENING, UNSPECIFIED: ICD-10-CM

## 2021-07-23 LAB
ANION GAP SERPL CALC-SCNC: 12 MMOL/L (ref 10–20)
BUN SERPL-MCNC: 15 MG/DL (ref 6–20)
BUN/CREAT SERPL: 20 (ref 7–25)
CALCIUM SERPL-MCNC: 9.3 MG/DL (ref 8.4–10.2)
CHLORIDE SERPL-SCNC: 104 MMOL/L (ref 98–107)
CO2 SERPL-SCNC: 27 MMOL/L (ref 21–32)
CREAT SERPL-MCNC: 0.75 MG/DL (ref 0.67–1.17)
DEPRECATED RDW RBC: 53.1 FL (ref 39–50)
ERYTHROCYTE [DISTWIDTH] IN BLOOD: 18.9 % (ref 11–15)
FASTING DURATION TIME PATIENT: ABNORMAL H
GFR SERPLBLD BASED ON 1.73 SQ M-ARVRAT: >90 ML/MIN/1.73M2
GLUCOSE SERPL-MCNC: 106 MG/DL (ref 65–99)
HCT VFR BLD CALC: 34.6 % (ref 39–51)
HGB BLD-MCNC: 10.6 G/DL (ref 13–17)
MAGNESIUM SERPL-MCNC: 2.3 MG/DL (ref 1.7–2.4)
MCH RBC QN AUTO: 23.8 PG (ref 26–34)
MCHC RBC AUTO-ENTMCNC: 30.6 G/DL (ref 32–36.5)
MCV RBC AUTO: 77.8 FL (ref 78–100)
NRBC BLD MANUAL-RTO: 0 /100 WBC
PHOSPHATE SERPL-MCNC: 5.6 MG/DL (ref 2.4–4.7)
PLATELET # BLD AUTO: 308 K/MCL (ref 140–450)
POTASSIUM SERPL-SCNC: 4.5 MMOL/L (ref 3.4–5.1)
RBC # BLD: 4.45 MIL/MCL (ref 4.5–5.9)
SODIUM SERPL-SCNC: 138 MMOL/L (ref 135–145)
WBC # BLD: 7.6 K/MCL (ref 4.2–11)

## 2021-07-23 PROCEDURE — PSEU8279 PHOSPHORUS: Performed by: CLINICAL MEDICAL LABORATORY

## 2021-07-23 PROCEDURE — 85027 COMPLETE CBC AUTOMATED: CPT | Performed by: CLINICAL MEDICAL LABORATORY

## 2021-07-23 PROCEDURE — 80048 BASIC METABOLIC PNL TOTAL CA: CPT | Performed by: CLINICAL MEDICAL LABORATORY

## 2021-07-23 PROCEDURE — PSEU8274 MAGNESIUM: Performed by: CLINICAL MEDICAL LABORATORY

## 2021-07-23 PROCEDURE — PSEU10425 CBC NO DIFFERENTIAL (PERFORMABLE ONLY): Performed by: CLINICAL MEDICAL LABORATORY

## 2021-07-23 PROCEDURE — PSEU8235 BASIC METABOLIC PANEL: Performed by: CLINICAL MEDICAL LABORATORY

## 2021-07-23 PROCEDURE — 83735 ASSAY OF MAGNESIUM: CPT | Performed by: CLINICAL MEDICAL LABORATORY

## 2021-07-23 PROCEDURE — 84100 ASSAY OF PHOSPHORUS: CPT | Performed by: CLINICAL MEDICAL LABORATORY

## 2021-07-27 ENCOUNTER — LAB REQUISITION (OUTPATIENT)
Dept: LAB | Age: 51
End: 2021-07-27

## 2021-07-27 DIAGNOSIS — Z13.9 ENCOUNTER FOR SCREENING, UNSPECIFIED: ICD-10-CM

## 2021-07-27 LAB
ALBUMIN SERPL-MCNC: 3 G/DL (ref 3.6–5.1)
ALBUMIN/GLOB SERPL: 0.7 {RATIO} (ref 1–2.4)
ALP SERPL-CCNC: 54 UNITS/L (ref 45–117)
ALT SERPL-CCNC: 36 UNITS/L
ANION GAP SERPL CALC-SCNC: 11 MMOL/L (ref 10–20)
AST SERPL-CCNC: 14 UNITS/L
BILIRUB SERPL-MCNC: 0.3 MG/DL (ref 0.2–1)
BUN SERPL-MCNC: 19 MG/DL (ref 6–20)
BUN/CREAT SERPL: 26 (ref 7–25)
CALCIUM SERPL-MCNC: 9.4 MG/DL (ref 8.4–10.2)
CHLORIDE SERPL-SCNC: 106 MMOL/L (ref 98–107)
CO2 SERPL-SCNC: 26 MMOL/L (ref 21–32)
CREAT SERPL-MCNC: 0.72 MG/DL (ref 0.67–1.17)
DEPRECATED RDW RBC: 53.1 FL (ref 39–50)
ERYTHROCYTE [DISTWIDTH] IN BLOOD: 19 % (ref 11–15)
FASTING DURATION TIME PATIENT: ABNORMAL H
GFR SERPLBLD BASED ON 1.73 SQ M-ARVRAT: >90 ML/MIN/1.73M2
GLOBULIN SER-MCNC: 4.2 G/DL (ref 2–4)
GLUCOSE SERPL-MCNC: 97 MG/DL (ref 65–99)
HCT VFR BLD CALC: 35.5 % (ref 39–51)
HGB BLD-MCNC: 10.8 G/DL (ref 13–17)
MAGNESIUM SERPL-MCNC: 2.2 MG/DL (ref 1.7–2.4)
MCH RBC QN AUTO: 23.6 PG (ref 26–34)
MCHC RBC AUTO-ENTMCNC: 30.4 G/DL (ref 32–36.5)
MCV RBC AUTO: 77.7 FL (ref 78–100)
NRBC BLD MANUAL-RTO: 0 /100 WBC
PHOSPHATE SERPL-MCNC: 5.5 MG/DL (ref 2.4–4.7)
PLATELET # BLD AUTO: 300 K/MCL (ref 140–450)
POTASSIUM SERPL-SCNC: 4.2 MMOL/L (ref 3.4–5.1)
PROT SERPL-MCNC: 7.2 G/DL (ref 6.4–8.2)
RBC # BLD: 4.57 MIL/MCL (ref 4.5–5.9)
SODIUM SERPL-SCNC: 139 MMOL/L (ref 135–145)
WBC # BLD: 8 K/MCL (ref 4.2–11)

## 2021-07-27 PROCEDURE — 83735 ASSAY OF MAGNESIUM: CPT | Performed by: CLINICAL MEDICAL LABORATORY

## 2021-07-27 PROCEDURE — PSEU10425 CBC NO DIFFERENTIAL (PERFORMABLE ONLY): Performed by: CLINICAL MEDICAL LABORATORY

## 2021-07-27 PROCEDURE — 85027 COMPLETE CBC AUTOMATED: CPT | Performed by: CLINICAL MEDICAL LABORATORY

## 2021-07-27 PROCEDURE — 84100 ASSAY OF PHOSPHORUS: CPT | Performed by: CLINICAL MEDICAL LABORATORY

## 2021-07-27 PROCEDURE — PSEU8250 COMPREHENSIVE METABOLIC PANEL: Performed by: CLINICAL MEDICAL LABORATORY

## 2021-07-27 PROCEDURE — PSEU8279 PHOSPHORUS: Performed by: CLINICAL MEDICAL LABORATORY

## 2021-07-27 PROCEDURE — 80053 COMPREHEN METABOLIC PANEL: CPT | Performed by: CLINICAL MEDICAL LABORATORY

## 2021-07-27 PROCEDURE — PSEU8274 MAGNESIUM: Performed by: CLINICAL MEDICAL LABORATORY

## (undated) DEVICE — STERILE SURGICAL LUBRICANT, METAL TUBE: Brand: SURGILUBE

## (undated) DEVICE — SUTURE CHROMIC GUT 2-0 UR-6

## (undated) DEVICE — SPONGE LAP 4X18

## (undated) DEVICE — Device

## (undated) DEVICE — MINOR GENERAL: Brand: MEDLINE INDUSTRIES, INC.

## (undated) DEVICE — TRAY SKIN PREP PVP-1

## (undated) DEVICE — MATERNITY KNIT PANTS,SEAMLESS: Brand: WINGS

## (undated) DEVICE — PENROSE DRAIN 12" X 1/4: Brand: CARDINAL HEALTH

## (undated) DEVICE — SUTURE CHROMIC GUT 2-0 SH

## (undated) DEVICE — PAD ALC 43.5X24IN PREP  LF

## (undated) DEVICE — SOL  .9 1000ML BTL

## (undated) NOTE — IP AVS SNAPSHOT
[facility]            (For Outpatient Use Only) Initial Admit Date: 6/9/2021   Inpt/Obs Admit Date: Inpt: 6/10/21 / Obs: N/A   Discharge Date:    Hospital Acct:  [de-identified]   MRN: [de-identified]   CSN: [id number]   CEID: [id number]        [facility]

## (undated) NOTE — LETTER
[facility]  Administration of Anesthesia  1. Gisell Monae, or _________________________________ acting on his behalf, (Patient) (Dependent/Representative) request to receive anesthesia for my pending procedure/operation/treatment.   ALEJANDRO nichols bleeding, seizure, cardiac arrest and death. 7. AWARENESS: I understand that it is possible (but unlikely) to have explicit memory of events from the operating room while under general anesthesia.   8. ELECTROCONVULSIVE THERAPY PATIENTS: This consent serve below affirms that prior to the time of the procedure, I have explained to the patient and/or his/her guardian, the risks and benefits of undergoing anesthesia, as well as any reasonable alternatives.     ___________________________________________________

## (undated) NOTE — IP AVS SNAPSHOT
Patient Demographics     Address  Jen Phone  [phone] [facility])  [phone] [facility] E-mail Address  [email or web address]      Emergency Contact(s)     Name Relation Home Work Juan Guy Sister   [phone] 0.9% IV bolus 2,000 mL 06/10/21 0024 New Bag      [id number] vancomycin IVPB premix 2g in 0.9% NaCl 500 mL 06/10/21 0110 New Bag            LEFT ANTECUBITAL     Order ID Medication Name Action Time Action Reason Comments    [id number] iopamidol (ISOVUE-M) 76 Type Source Collected On   Blood — 06/10/21 0613          Components    Component Value Reference Range Flag Lab   Glucose 198 70 - 99 mg/dL [facility] [address])   Sodium 134 136 - 145 mmol/L [facility] ([facility])   Potassium 4.1 3.5 - 5.1 mmol/L [employer] ([facility])   FIO2 80.00 — — [facility] ([facility])   PEEP 5.0 cm H2O — [facility] ([facility])   Modified Allens Test Positive — — [facility] ([facility])   Puncture Charge Yes — — [facility] ([facility])   Blood Gas [facility] ([facility])    Specimen Information    Type Source Collected On   Blood — 06/09/21 2310          Components    Component Value Reference Range Flag Lab   PTT 25.6 23.2 - 35.3 seconds — [facility] ([facility])            Lactic Acid, Pl Lab ([facility])   Promyelocyte Absolute Manual 0.10 0 x10(3) uL [facility] ([facility])   Neutrophils % Manual 69 % — [facility] ([facility])   Band % 10 % — [facility] ([facility])   Lymphocyte % Manual 4 % — [facility] ([facility])   Monocyte % Manual 9 % — [facility] Resulting lab: [facility] ([facility])    Specimen Information    Type Source Collected On   Blood — 06/09/21 6807          Components    Component Value Reference Range Flag Lab   Glucose 232 70 - 99 mg/dL [facility])   Sod YFCBE[[id number]]                                        In process                   Please view results for these tests on the individual orders. AFB CULTURE(P) [[id number]] Collected: 06/10/21 0336    Order Status: Sent Lab Status:  In process Updated: Blood Culture [[id number]] Collected: 06/09/21 2204    Order Status: Resulted Lab Status: In process Updated: 06/09/21 2209    Specimen: Blood,peripheral     Blood Culture [[id number]] Collected: 06/09/21 2151    Order Status: Resulted Lab Status:  In process scrotum, perineum, and rectal area with gas tracking up the left flank to the level of the kidneys. History:[RS.1]  History reviewed. No pertinent past medical history. History reviewed. No pertinent surgical history. No family history on file.    repo Recommendations:  He has extensive necrosis which may have started from a perirectal source. I recommended a tertiary care center for care but there were apparently no beds currently at [facility] for transfer.  We will proceed with a preliminary debridement to

## (undated) NOTE — LETTER
[address]  [address] [place], [place]      Authorization for Surgical Operation and Procedure     Date:___________                                                                                                         Time:_______ but not all, of the potential risks that can occur: fever and allergic reactions, hemolytic reactions, transmission of diseases such as Hepatitis, AIDS and Cytomegalovirus (CMV) and fluid overload.   In the event that I wish to have an autologous transfusio attending physician will determine when the applicable recovery period ends for purposes of reinstating the DNAR order.   10. Patients having a sterilization procedure: I understand that if the procedure is successful the results will be permanent and it wi _______________________________________________________________ _____________________________  Hermila Linares                                                                                         (Date)                                   (Time)

## (undated) NOTE — LETTER
[address] [place] [address]      Authorization for Surgical Operation and Procedure     Date:___________                                                                                                         Time:_______ The following are some, but not all, of the potential risks that can occur: fever and allergic reactions, hemolytic reactions, transmission of diseases such as Hepatitis, AIDS and Cytomegalovirus (CMV) and fluid overload.   In the event that I wish to have behalf). The surgeon or my attending physician will determine when the applicable recovery period ends for purposes of reinstating the DNAR order.   10. Patients having a sterilization procedure: I understand that if the procedure is successful the results discussion with my patient.     _______________________________________________________________ _____________________________  Bari Garner Physician)                                                                                         (Date)